# Patient Record
Sex: FEMALE | NOT HISPANIC OR LATINO | Employment: PART TIME | URBAN - METROPOLITAN AREA
[De-identification: names, ages, dates, MRNs, and addresses within clinical notes are randomized per-mention and may not be internally consistent; named-entity substitution may affect disease eponyms.]

---

## 2017-02-02 ENCOUNTER — OFFICE VISIT (OUTPATIENT)
Dept: INTERNAL MEDICINE | Facility: CLINIC | Age: 32
End: 2017-02-02

## 2017-02-02 VITALS
WEIGHT: 229 LBS | DIASTOLIC BLOOD PRESSURE: 72 MMHG | BODY MASS INDEX: 35.94 KG/M2 | SYSTOLIC BLOOD PRESSURE: 116 MMHG | HEIGHT: 67 IN

## 2017-02-02 DIAGNOSIS — K21.00 REFLUX ESOPHAGITIS: Primary | ICD-10-CM

## 2017-02-02 LAB
ALBUMIN SERPL-MCNC: 3.9 G/DL (ref 3.2–4.8)
ALBUMIN/GLOB SERPL: 1.4 G/DL (ref 1.5–2.5)
ALP SERPL-CCNC: 57 U/L (ref 25–100)
ALT SERPL-CCNC: 26 U/L (ref 7–40)
AST SERPL-CCNC: 20 U/L (ref 0–33)
BILIRUB SERPL-MCNC: 0.6 MG/DL (ref 0.3–1.2)
BUN SERPL-MCNC: 11 MG/DL (ref 9–23)
BUN/CREAT SERPL: 13.8 (ref 7–25)
CALCIUM SERPL-MCNC: 9.8 MG/DL (ref 8.7–10.4)
CHLORIDE SERPL-SCNC: 105 MMOL/L (ref 99–109)
CO2 SERPL-SCNC: 27 MMOL/L (ref 20–31)
CREAT SERPL-MCNC: 0.8 MG/DL (ref 0.6–1.3)
GLOBULIN SER CALC-MCNC: 2.7 GM/DL
GLUCOSE SERPL-MCNC: 86 MG/DL (ref 70–100)
LIPASE SERPL-CCNC: 36 U/L (ref 6–51)
POTASSIUM SERPL-SCNC: 4.2 MMOL/L (ref 3.5–5.5)
PROT SERPL-MCNC: 6.6 G/DL (ref 5.7–8.2)
SODIUM SERPL-SCNC: 138 MMOL/L (ref 132–146)

## 2017-02-02 PROCEDURE — 99213 OFFICE O/P EST LOW 20 MIN: CPT | Performed by: PHYSICIAN ASSISTANT

## 2017-02-02 RX ORDER — OMEPRAZOLE 40 MG/1
40 CAPSULE, DELAYED RELEASE ORAL DAILY
Qty: 30 CAPSULE | Refills: 2 | Status: SHIPPED | OUTPATIENT
Start: 2017-02-02 | End: 2017-07-16 | Stop reason: SDUPTHER

## 2017-02-02 NOTE — PROGRESS NOTES
Chief Complaint   Patient presents with   • Vomiting, heart burn ongoing x3 weeks     Sharp abdominal pain x1 year       Subjective   Serena Marquez is a 31 y.o. female.       History of Present Illness     Pt has had intermittent sharp abdominal pain for over a year- last for a few seconds and then resolves.    About 3 weeks ago, pt woke up with heartburn and started vomiting. Felt better for most of the day with some mild residual heartburn. Now has continued to happen once a week. Tried some carafate and it helps after a while. Tried tums, prilosec for about a week, zantac. Happens about 1/2 hr to 1 hr after she wakes up if it is going to happen. Usually before she eats.     Current Outpatient Prescriptions:   •  desonide (DESOWEN) 0.05 % cream, Apply  topically., Disp: , Rfl:   •  DiphenhydrAMINE HCl, Sleep, 25 MG capsule, Take  by mouth., Disp: , Rfl:   •  FLUoxetine (PROzac) 40 MG capsule, Take 1 capsule by mouth Daily., Disp: 30 capsule, Rfl: 0  •  levocetirizine (XYZAL) 5 MG tablet, Take  by mouth., Disp: , Rfl:   •  levothyroxine (SYNTHROID, LEVOTHROID) 200 MCG tablet, Take 200 mcg by mouth Daily., Disp: , Rfl:   •  lysine acetate 500 MG tablet tablet, Take 1,000 mg by mouth Daily., Disp: , Rfl:   •  montelukast (SINGULAIR) 10 MG tablet, Take 1 tablet by mouth every night., Disp: 30 tablet, Rfl: 0  •  Norgestimate-Eth Estradiol (SPRINTEC 28 PO), Take  by mouth., Disp: , Rfl:   •  predniSONE (DELTASONE) 10 MG tablet, Take 4 tablets for 3 days, then 3 tablets for 3 days, then 2 tablets for 3 days, then 1 tablet for 3 days, Disp: 30 tablet, Rfl: 0  •  spironolactone (ALDACTONE) 50 MG tablet, Take 1 tablet by mouth daily., Disp: 30 tablet, Rfl: 0  •  SPRINTEC 28 0.25-35 MG-MCG per tablet, TAKE ONE TABLET BY MOUTH ONCE DAILY AS DIRECTED, Disp: 168 tablet, Rfl: 0  •  sucralfate (CARAFATE) 1 GM/10ML suspension, Take 1 g by mouth 4 (Four) Times a Day., Disp: , Rfl:   •  omeprazole (PRILOSEC) 40 MG capsule, Take 1  "capsule by mouth Daily., Disp: 30 capsule, Rfl: 2     PMFSH  The following portions of the patient's history were reviewed and updated as appropriate: allergies, current medications, past family history, past medical history, past social history, past surgical history and problem list.    Review of Systems   Constitutional: Positive for appetite change. Negative for activity change, chills, diaphoresis, fatigue and fever.   HENT: Negative for congestion, postnasal drip, rhinorrhea, sore throat and trouble swallowing.    Respiratory: Negative for choking, chest tightness, shortness of breath and wheezing.    Cardiovascular: Negative for chest pain and palpitations.   Gastrointestinal: Positive for abdominal pain, nausea and vomiting. Negative for abdominal distention, blood in stool, constipation, diarrhea and rectal pain.   Genitourinary: Negative.    Musculoskeletal: Negative for arthralgias and myalgias.   Skin: Negative.    Neurological: Negative for dizziness, weakness and light-headedness.   Hematological: Negative for adenopathy.   Psychiatric/Behavioral: Negative.        Objective   Visit Vitals   • /72   • Ht 67\" (170.2 cm)   • Wt 229 lb (104 kg)   • BMI 35.87 kg/m2       Physical Exam   Constitutional: She appears well-developed and well-nourished.   HENT:   Head: Normocephalic.   Right Ear: Hearing, tympanic membrane, external ear and ear canal normal.   Left Ear: Hearing, tympanic membrane, external ear and ear canal normal.   Nose: Nose normal.   Mouth/Throat: Oropharynx is clear and moist.   Eyes: Conjunctivae are normal. Pupils are equal, round, and reactive to light.   Neck: Normal range of motion.   Cardiovascular: Normal rate, regular rhythm and normal heart sounds.    Pulmonary/Chest: Effort normal and breath sounds normal. She has no decreased breath sounds. She has no wheezes. She has no rhonchi. She has no rales.   Abdominal: Soft. Bowel sounds are normal. There is no hepatosplenomegaly. " There is tenderness in the epigastric area and left upper quadrant. There is no rigidity, no rebound, no guarding, no CVA tenderness and negative Ly's sign.   Musculoskeletal: Normal range of motion.   Neurological: She is alert.   Skin: Skin is warm and dry.   Psychiatric: She has a normal mood and affect. Her behavior is normal.   Nursing note and vitals reviewed.           ASSESSMENT/PLAN    Problem List Items Addressed This Visit        Digestive    Reflux esophagitis - Primary     Check stool for h. Pylori (pt recently took antacids), cmp, lipase. Start prescription strength omeprazole. Refer for EGD. Further recommendations based on results.           Relevant Medications    omeprazole (PRILOSEC) 40 MG capsule    Other Relevant Orders    H. Pylori Antigen, Stool    Ambulatory referral for Screening EGD    Comprehensive Metabolic Panel    Lipase               Return if symptoms worsen or fail to improve, for Next scheduled follow up.

## 2017-02-02 NOTE — ASSESSMENT & PLAN NOTE
Check stool for h. Pylori (pt recently took antacids), cmp, lipase. Start prescription strength omeprazole. Refer for EGD. Further recommendations based on results.

## 2017-02-05 LAB — H PYLORI AG STL QL IA: NEGATIVE

## 2017-04-10 ENCOUNTER — LAB REQUISITION (OUTPATIENT)
Dept: LAB | Facility: HOSPITAL | Age: 32
End: 2017-04-10

## 2017-04-10 ENCOUNTER — OUTSIDE FACILITY SERVICE (OUTPATIENT)
Dept: GASTROENTEROLOGY | Facility: CLINIC | Age: 32
End: 2017-04-10

## 2017-04-10 DIAGNOSIS — R11.12 PROJECTILE VOMITING: ICD-10-CM

## 2017-04-10 DIAGNOSIS — K21.9 GASTRO-ESOPHAGEAL REFLUX DISEASE WITHOUT ESOPHAGITIS: ICD-10-CM

## 2017-04-10 DIAGNOSIS — R10.13 EPIGASTRIC PAIN: ICD-10-CM

## 2017-04-10 PROCEDURE — 88305 TISSUE EXAM BY PATHOLOGIST: CPT | Performed by: INTERNAL MEDICINE

## 2017-04-10 PROCEDURE — 43239 EGD BIOPSY SINGLE/MULTIPLE: CPT | Performed by: INTERNAL MEDICINE

## 2017-04-14 LAB
CYTO UR: NORMAL
LAB AP CASE REPORT: NORMAL
LAB AP CLINICAL INFORMATION: NORMAL
Lab: NORMAL
PATH REPORT.FINAL DX SPEC: NORMAL
PATH REPORT.GROSS SPEC: NORMAL

## 2017-04-17 ENCOUNTER — TELEPHONE (OUTPATIENT)
Dept: GASTROENTEROLOGY | Facility: CLINIC | Age: 32
End: 2017-04-17

## 2017-04-17 NOTE — TELEPHONE ENCOUNTER
----- Message from Vince Floyd MD sent at 4/17/2017 11:47 AM EDT -----  Let Mrs. Marquez know there is evidence for reflux esophagitis.  She did have some gastritis without H. pylori.  I recommend avoiding all non-steroidal medication.  She should continue Prilosec once daily.  Thank you,  BENNETT

## 2017-07-16 DIAGNOSIS — K21.00 REFLUX ESOPHAGITIS: ICD-10-CM

## 2017-07-17 RX ORDER — OMEPRAZOLE 40 MG/1
CAPSULE, DELAYED RELEASE ORAL
Qty: 30 CAPSULE | Refills: 2 | Status: SHIPPED | OUTPATIENT
Start: 2017-07-17 | End: 2018-01-23

## 2017-07-28 ENCOUNTER — OFFICE VISIT (OUTPATIENT)
Dept: INTERNAL MEDICINE | Facility: CLINIC | Age: 32
End: 2017-07-28

## 2017-07-28 VITALS
OXYGEN SATURATION: 99 % | HEART RATE: 77 BPM | BODY MASS INDEX: 34.3 KG/M2 | WEIGHT: 219 LBS | SYSTOLIC BLOOD PRESSURE: 128 MMHG | TEMPERATURE: 98.1 F | DIASTOLIC BLOOD PRESSURE: 68 MMHG
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
## 2017-07-28 PROCEDURE — 99213 OFFICE O/P EST LOW 20 MIN: CPT | Performed by: PHYSICIAN ASSISTANT

## 2017-07-28 NOTE — PROGRESS NOTES
Chief Complaint   Patient presents with   • Rabies exposure treatment     bit by a dog on 07/09/17, sinus congestion        Subjective   Serena Marquez is a 32 y.o. female.       History of Present Illness     Pt was working at [employer] and on 7/9/17 she was putting one of the dogs back in the kennel and was bit on her right first finger. She washed the wound with dish soap immediately after and told the supervisor and then went to [facility] the next day. Had significant swelling at the time. Was treated with tetanus shot and abx. She was unable to talk to the dog owner or get access to dog's chart to check on rabies vaccination status. Recently notified by letter from health department that she needs to get rabies vaccine because they have been unable to locate dog owner. Pt's injury has resolved and she is otherwise asymptomatic.      Current Outpatient Prescriptions:   •  acetaZOLAMIDE (DIAMOX) 250 MG tablet, Take 250 mg by mouth 3 (Three) Times a Day., Disp: , Rfl:   •  desonide (DESOWEN) 0.05 % cream, Apply  topically., Disp: , Rfl:   •  DiphenhydrAMINE HCl, Sleep, 25 MG capsule, Take  by mouth., Disp: , Rfl:   •  Etonogestrel (NEXPLANON) 68 MG implant subdermal implant, Inject 1 each into the skin 1 (One) Time., Disp: , Rfl:   •  FLUoxetine (PROzac) 40 MG capsule, Take 1 capsule by mouth Daily., Disp: 30 capsule, Rfl: 0  •  levocetirizine (XYZAL) 5 MG tablet, Take  by mouth., Disp: , Rfl:   •  levothyroxine (SYNTHROID, LEVOTHROID) 200 MCG tablet, Take 200 mcg by mouth Daily., Disp: , Rfl:   •  lysine acetate 500 MG tablet tablet, Take 1,000 mg by mouth Daily., Disp: , Rfl:   •  montelukast (SINGULAIR) 10 MG tablet, Take 1 tablet by mouth every night., Disp: 30 tablet, Rfl: 0  •  Norgestimate-Eth Estradiol (SPRINTEC 28 PO), Take  by mouth., Disp: , Rfl:   •  omeprazole (priLOSEC) 40 MG capsule, TAKE ONE CAPSULE BY MOUTH ONCE DAILY, Disp: 30 capsule, Rfl: 2  •  predniSONE (DELTASONE) 10 MG tablet, Take 4 tablets  for 3 days, then 3 tablets for 3 days, then 2 tablets for 3 days, then 1 tablet for 3 days, Disp: 30 tablet, Rfl: 0  •  spironolactone (ALDACTONE) 50 MG tablet, Take 1 tablet by mouth daily., Disp: 30 tablet, Rfl: 0  •  SPRINTEC 28 0.25-35 MG-MCG per tablet, TAKE ONE TABLET BY MOUTH ONCE DAILY AS DIRECTED, Disp: 168 tablet, Rfl: 0  •  sucralfate (CARAFATE) 1 GM/10ML suspension, Take 1 g by mouth 4 (Four) Times a Day., Disp: , Rfl:      PMFSH  The following portions of the patient's history were reviewed and updated as appropriate: allergies, current medications, past family history, past medical history, past social history, past surgical history and problem list.    Review of Systems   Constitutional: Negative for appetite change, fever and unexpected weight change.   HENT: Negative for ear pain, facial swelling and sore throat.    Eyes: Negative for pain and visual disturbance.   Respiratory: Negative for chest tightness, shortness of breath and wheezing.    Cardiovascular: Negative for chest pain and palpitations.   Gastrointestinal: Negative for abdominal pain and blood in stool.   Endocrine: Negative.    Genitourinary: Negative for difficulty urinating and hematuria.   Musculoskeletal: Negative for joint swelling.   Neurological: Negative for tremors, seizures, syncope and headaches.   Hematological: Negative for adenopathy.   Psychiatric/Behavioral: Negative.        Objective   /68  Pulse 77  Temp 98.1 °F (36.7 °C)  Wt 219 lb (99.3 kg)  SpO2 99%  BMI 34.3 kg/m2    Physical Exam   Constitutional: She appears well-developed and well-nourished.   HENT:   Head: Normocephalic.   Right Ear: Hearing, tympanic membrane, external ear and ear canal normal.   Left Ear: Hearing, tympanic membrane, external ear and ear canal normal.   Nose: Nose normal.   Mouth/Throat: Oropharynx is clear and moist.   Eyes: Conjunctivae are normal. Pupils are equal, round, and reactive to light.   Neck: Normal range of motion.  "  Cardiovascular: Normal rate, regular rhythm and normal heart sounds.    Pulmonary/Chest: Effort normal and breath sounds normal. She has no decreased breath sounds. She has no wheezes. She has no rhonchi. She has no rales.   Musculoskeletal: Normal range of motion.   Neurological: She is alert.   Skin: Skin is warm and dry.   Psychiatric: She has a normal mood and affect. Her behavior is normal.   Nursing note and vitals reviewed.           ASSESSMENT/PLAN    Problem List Items Addressed This Visit     None      Visit Diagnoses     Dog bite, initial encounter    -  Primary    Unkown status of dog's rabies vaccinations. Called [facility] and spoke with manager who says they will fax over the imm. records. Further recs based on results.      8/4/2017- ADDENDUM: Received vaccination records from [facility]- showed UTD Rabies vaccine for \"Helen\", the dog who bit pt. Vaccine expires 9/26/2017- records scanned into chart.         Return for Recheck.  "

## 2017-08-07 ENCOUNTER — OFFICE VISIT (OUTPATIENT)
Dept: INTERNAL MEDICINE | Facility: CLINIC | Age: 32
End: 2017-08-07

## 2017-08-07 VITALS
OXYGEN SATURATION: 94 % | DIASTOLIC BLOOD PRESSURE: 60 MMHG | BODY MASS INDEX: 34.93 KG/M2 | WEIGHT: 223 LBS | HEART RATE: 78 BPM | SYSTOLIC BLOOD PRESSURE: 122 MMHG

## 2017-08-07 DIAGNOSIS — Z00.00 HEALTH CARE MAINTENANCE: Primary | ICD-10-CM

## 2017-08-07 LAB
BILIRUB BLD-MCNC: NEGATIVE MG/DL
CLARITY, POC: CLEAR
COLOR UR: YELLOW
GLUCOSE UR STRIP-MCNC: NEGATIVE MG/DL
KETONES UR QL: NEGATIVE
LEUKOCYTE EST, POC: NEGATIVE
NITRITE UR-MCNC: NEGATIVE MG/ML
PH UR: 7 [PH] (ref 5–8)
PROT UR STRIP-MCNC: NEGATIVE MG/DL
RBC # UR STRIP: NEGATIVE /UL
SP GR UR: 1.01 (ref 1–1.03)
UROBILINOGEN UR QL: NORMAL

## 2017-08-07 PROCEDURE — 81003 URINALYSIS AUTO W/O SCOPE: CPT | Performed by: PHYSICIAN ASSISTANT

## 2017-08-07 PROCEDURE — 86580 TB INTRADERMAL TEST: CPT | Performed by: PHYSICIAN ASSISTANT

## 2017-08-07 PROCEDURE — 99395 PREV VISIT EST AGE 18-39: CPT | Performed by: PHYSICIAN ASSISTANT

## 2017-08-07 NOTE — PROGRESS NOTES
"Chief Complaint   Patient presents with   • Annual Exam       Subjective   Serena Marquez is a 32 y.o. female.       History of Present Illness     The patient is being seen for a health maintenance evaluation.  The last health maintenance was 3 year(s) ago.    Social History  Serena  does not smoke cigarettes.   She drinks rare alcohol.  She does not use illicit drugs.    General History  Serena  does have regular dental visits.  She does complain of vision problems. Wears reading glasses. Last eye exam was 3/2017.  Immunizations are up to date. The patient needs the following immunizations: TDaP done 7/10/17; 12/2016; needs titer for hepatitis B; utd on MMR    Lifestyle  Serena  consumes in general, a \"healthy\" diet  .  She exercises never.    Reproductive Health  Serena  is premenopausal.  She reports irregular periods.  She is not sexually active. Her contraceptive plan is oral contraceptives (estrogen/progesterone).    Screening  Last pap was 7/2017 by Dr Bales at .  Last mammogram was  Never. Paternal GM with breast cancer in her late 50s.  Last colonoscopy was never. Paternal GF with colon CA in age 70s. Father has had normal colonoscopies.  Last DEXA was never.                      Current Outpatient Prescriptions:   •  acetaZOLAMIDE (DIAMOX) 250 MG tablet, Take 250 mg by mouth 3 (Three) Times a Day., Disp: , Rfl:   •  desonide (DESOWEN) 0.05 % cream, Apply  topically., Disp: , Rfl:   •  DiphenhydrAMINE HCl, Sleep, 25 MG capsule, Take  by mouth., Disp: , Rfl:   •  Etonogestrel (NEXPLANON) 68 MG implant subdermal implant, Inject 1 each into the skin 1 (One) Time., Disp: , Rfl:   •  FLUoxetine (PROzac) 40 MG capsule, Take 1 capsule by mouth Daily., Disp: 30 capsule, Rfl: 0  •  levocetirizine (XYZAL) 5 MG tablet, Take  by mouth., Disp: , Rfl:   •  levothyroxine (SYNTHROID, LEVOTHROID) 200 MCG tablet, Take 200 mcg by mouth Daily., Disp: , Rfl:   •  lysine acetate 500 MG tablet tablet, Take 1,000 mg by " mouth Daily., Disp: , Rfl:   •  montelukast (SINGULAIR) 10 MG tablet, Take 1 tablet by mouth every night., Disp: 30 tablet, Rfl: 0  •  Norgestimate-Eth Estradiol (SPRINTEC 28 PO), Take  by mouth., Disp: , Rfl:   •  omeprazole (priLOSEC) 40 MG capsule, TAKE ONE CAPSULE BY MOUTH ONCE DAILY, Disp: 30 capsule, Rfl: 2  •  spironolactone (ALDACTONE) 50 MG tablet, Take 1 tablet by mouth daily., Disp: 30 tablet, Rfl: 0  •  SPRINTEC 28 0.25-35 MG-MCG per tablet, TAKE ONE TABLET BY MOUTH ONCE DAILY AS DIRECTED, Disp: 168 tablet, Rfl: 0  •  sucralfate (CARAFATE) 1 GM/10ML suspension, Take 1 g by mouth 4 (Four) Times a Day., Disp: , Rfl:      PMFSH  The following portions of the patient's history were reviewed and updated as appropriate: allergies, current medications, past family history, past medical history, past social history, past surgical history and problem list.    Review of Systems   Constitutional: Negative for appetite change, fever and unexpected weight change.   HENT: Negative for ear pain, facial swelling and sore throat.    Eyes: Negative for pain and visual disturbance.   Respiratory: Negative for chest tightness, shortness of breath and wheezing.    Cardiovascular: Negative for chest pain and palpitations.   Gastrointestinal: Negative for abdominal pain and blood in stool.   Endocrine: Negative.    Genitourinary: Negative for difficulty urinating and hematuria.   Musculoskeletal: Negative for joint swelling.   Neurological: Negative for tremors, seizures and syncope.   Hematological: Negative for adenopathy.   Psychiatric/Behavioral: Negative.        Objective   /60  Pulse 78  Wt 223 lb (101 kg)  SpO2 94%  BMI 34.93 kg/m2    Physical Exam   Constitutional: She is oriented to person, place, and time. She appears well-developed and well-nourished. No distress.   HENT:   Head: Normocephalic and atraumatic. Hair is normal.   Right Ear: Hearing, tympanic membrane, external ear and ear canal normal. No  drainage. No decreased hearing is noted.   Left Ear: Hearing, tympanic membrane, external ear and ear canal normal. No decreased hearing is noted.   Nose: No nasal deformity.   Mouth/Throat: Oropharynx is clear and moist.   Eyes: Conjunctivae, EOM and lids are normal. Pupils are equal, round, and reactive to light. Lids are everted and swept, no foreign bodies found. Right eye exhibits no discharge. Left eye exhibits no discharge.   Fundoscopic exam:       The right eye shows red reflex.        The left eye shows red reflex.   Neck: Normal range of motion. Neck supple. No JVD present. No tracheal deviation present. No thyromegaly present.   Cardiovascular: Normal rate, regular rhythm, normal heart sounds, intact distal pulses and normal pulses.  Exam reveals no gallop and no friction rub.    No murmur heard.  Pulmonary/Chest: Effort normal and breath sounds normal. No respiratory distress. She has no wheezes. She has no rales. She exhibits no tenderness.   Abdominal: Soft. Bowel sounds are normal. She exhibits no distension and no mass. There is no tenderness. There is no rebound and no guarding. No hernia.   Musculoskeletal: Normal range of motion. She exhibits no edema, tenderness or deformity.   Lymphadenopathy:     She has no cervical adenopathy.        Right: No inguinal adenopathy present.        Left: No inguinal adenopathy present.   Neurological: She is alert and oriented to person, place, and time. She has normal reflexes. She displays normal reflexes. No cranial nerve deficit. She exhibits normal muscle tone. Coordination normal.   Skin: Skin is warm and dry. No rash noted. She is not diaphoretic. No erythema.   Psychiatric: She has a normal mood and affect. Her behavior is normal. Judgment and thought content normal.   Nursing note and vitals reviewed.      Results for orders placed or performed in visit on 08/07/17   POCT urinalysis dipstick, automated   Result Value Ref Range    Color Yellow Yellow,  Straw, Dark Yellow, Arlet    Clarity, UA Clear Clear    Glucose, UA Negative Negative, 1000 mg/dL (3+) mg/dL    Bilirubin Negative Negative    Ketones, UA Negative Negative    Specific Gravity  1.010 1.005 - 1.030    Blood, UA Negative Negative    pH, Urine 7.0 5.0 - 8.0    Protein, POC Negative Negative mg/dL    Urobilinogen, UA Normal Normal    Leukocytes Negative Negative    Nitrite, UA Negative Negative        ASSESSMENT/PLAN    Problem List Items Addressed This Visit        Other    Health care maintenance - Primary     Immunizations: check hepatitis B titer; TDaP done 7/2017; influenza vaccine done 12/2016; TB skin test today  Eye exam: done 3/2017  Pap Smear: done 7/2017, Dr Bales at   Mammogram: due age 42  Dexa: due age 52  Colonoscopy: due age 52  Labs: fasting labs ordered today         Relevant Orders    Hepatitis B Surface Antibody    Lipid Panel    Comprehensive Metabolic Panel    CBC (No Diff)    TSH    Vitamin D 25 Hydroxy    TB Skin Test (Completed)    T3, Free    T4, Free    POCT urinalysis dipstick, automated (Completed)               Return in about 1 year (around 8/7/2018) for Annual physical.

## 2017-08-08 LAB
25(OH)D3+25(OH)D2 SERPL-MCNC: 27.4 NG/ML
ALBUMIN SERPL-MCNC: 4.1 G/DL (ref 3.2–4.8)
ALBUMIN/GLOB SERPL: 1.8 G/DL (ref 1.5–2.5)
ALP SERPL-CCNC: 77 U/L (ref 25–100)
ALT SERPL-CCNC: 12 U/L (ref 7–40)
AST SERPL-CCNC: 14 U/L (ref 0–33)
BILIRUB SERPL-MCNC: 0.4 MG/DL (ref 0.3–1.2)
BUN SERPL-MCNC: 12 MG/DL (ref 9–23)
BUN/CREAT SERPL: 17.1 (ref 7–25)
CALCIUM SERPL-MCNC: 9.3 MG/DL (ref 8.7–10.4)
CHLORIDE SERPL-SCNC: 111 MMOL/L (ref 99–109)
CHOLEST SERPL-MCNC: 180 MG/DL (ref 0–200)
CO2 SERPL-SCNC: 24 MMOL/L (ref 20–31)
CREAT SERPL-MCNC: 0.7 MG/DL (ref 0.6–1.3)
ERYTHROCYTE [DISTWIDTH] IN BLOOD BY AUTOMATED COUNT: 12.8 % (ref 11.3–14.5)
GLOBULIN SER CALC-MCNC: 2.3 GM/DL
GLUCOSE SERPL-MCNC: 87 MG/DL (ref 70–100)
HBV SURFACE AB SER QL: REACTIVE
HCT VFR BLD AUTO: 41.2 % (ref 34.5–44)
HDLC SERPL-MCNC: 58 MG/DL (ref 40–60)
HGB BLD-MCNC: 13.7 G/DL (ref 11.5–15.5)
LDLC SERPL CALC-MCNC: 108 MG/DL (ref 0–100)
MCH RBC QN AUTO: 30.2 PG (ref 27–31)
MCHC RBC AUTO-ENTMCNC: 33.3 G/DL (ref 32–36)
MCV RBC AUTO: 90.9 FL (ref 80–99)
PLATELET # BLD AUTO: 272 10*3/MM3 (ref 150–450)
POTASSIUM SERPL-SCNC: 4.3 MMOL/L (ref 3.5–5.5)
PROT SERPL-MCNC: 6.4 G/DL (ref 5.7–8.2)
RBC # BLD AUTO: 4.53 10*6/MM3 (ref 3.89–5.14)
SODIUM SERPL-SCNC: 140 MMOL/L (ref 132–146)
T3FREE SERPL-MCNC: 3.8 PG/ML (ref 2–4.4)
T4 FREE SERPL-MCNC: 1.45 NG/DL (ref 0.89–1.76)
TRIGL SERPL-MCNC: 68 MG/DL (ref 0–150)
TSH SERPL DL<=0.005 MIU/L-ACNC: 0.01 MIU/ML (ref 0.35–5.35)
VLDLC SERPL CALC-MCNC: 13.6 MG/DL
WBC # BLD AUTO: 6 10*3/MM3 (ref 3.5–10.8)

## 2017-08-08 NOTE — ASSESSMENT & PLAN NOTE
Immunizations: check hepatitis B titer; TDaP done 7/2017; influenza vaccine done 12/2016; TB skin test today  Eye exam: done 3/2017  Pap Smear: done 7/2017, Dr Bales at   Mammogram: due age 42  Dexa: due age 52  Colonoscopy: due age 52  Labs: fasting labs ordered today

## 2017-08-09 LAB
INDURATION: 0 MM (ref 0–10)
TB SKIN TEST: NEGATIVE

## 2017-08-10 ENCOUNTER — TELEPHONE (OUTPATIENT)
Dept: INTERNAL MEDICINE | Facility: CLINIC | Age: 32
End: 2017-08-10

## 2017-08-10 NOTE — TELEPHONE ENCOUNTER
PATIENT IS CALLING REQUESTING A COPY OF HER PHYSICAL THAT SHE HAD DONE AUGUST 7TH, 2017. PLEASE CALL BACK AND ADVISE.

## 2017-08-10 NOTE — PROGRESS NOTES
Your TSH is low, indicating that your levothyroxine dose is too high. Please call our office to confirm your dose and I will send in the next dose down from that.    Your hepatitis B lab confirms your immunity- no need for an additional vaccine.    Everything else looks fine!

## 2017-08-11 NOTE — TELEPHONE ENCOUNTER
Called and informed pt that her physical form has been completed by Chiquis and is ready for , placed form at front.

## 2018-01-03 RX ORDER — FLUOXETINE HYDROCHLORIDE 40 MG/1
40 CAPSULE ORAL DAILY
Qty: 30 CAPSULE | Refills: 1 | Status: SHIPPED | OUTPATIENT
Start: 2018-01-03 | End: 2018-01-23

## 2018-01-03 NOTE — TELEPHONE ENCOUNTER
Received fax from [facility] pharmacy requesting refill for fluoxetine 40 mg cap, med refilled electronically pt is within protocol.

## 2018-01-23 ENCOUNTER — TELEPHONE (OUTPATIENT)
Dept: INTERNAL MEDICINE | Facility: CLINIC | Age: 33
End: 2018-01-23

## 2018-01-23 ENCOUNTER — OFFICE VISIT (OUTPATIENT)
Dept: INTERNAL MEDICINE | Facility: CLINIC | Age: 33
End: 2018-01-23

## 2018-01-23 VITALS
BODY MASS INDEX: 34.77 KG/M2 | DIASTOLIC BLOOD PRESSURE: 70 MMHG | HEART RATE: 78 BPM | WEIGHT: 222 LBS | OXYGEN SATURATION: 100 % | SYSTOLIC BLOOD PRESSURE: 118 MMHG

## 2018-01-23 DIAGNOSIS — F41.8 DEPRESSION WITH ANXIETY: Primary | Chronic | ICD-10-CM

## 2018-01-23 DIAGNOSIS — J30.2 CHRONIC SEASONAL ALLERGIC RHINITIS, UNSPECIFIED TRIGGER: Chronic | ICD-10-CM

## 2018-01-23 PROCEDURE — 99213 OFFICE O/P EST LOW 20 MIN: CPT | Performed by: PHYSICIAN ASSISTANT

## 2018-01-23 RX ORDER — FLUOXETINE HYDROCHLORIDE 60 MG/1
60 TABLET, FILM COATED ORAL; ORAL DAILY
Qty: 30 TABLET | Refills: 5 | Status: SHIPPED | OUTPATIENT
Start: 2018-01-23 | End: 2018-01-23

## 2018-01-23 RX ORDER — BUSPIRONE HYDROCHLORIDE 5 MG/1
5 TABLET ORAL 2 TIMES DAILY
Qty: 60 TABLET | Refills: 2 | Status: SHIPPED | OUTPATIENT
Start: 2018-01-23 | End: 2018-02-20 | Stop reason: SDUPTHER

## 2018-01-23 RX ORDER — MONTELUKAST SODIUM 10 MG/1
10 TABLET ORAL NIGHTLY
Qty: 30 TABLET | Refills: 5 | Status: SHIPPED | OUTPATIENT
Start: 2018-01-23 | End: 2018-07-28 | Stop reason: SDUPTHER

## 2018-01-23 RX ORDER — FLUOXETINE HYDROCHLORIDE 20 MG/1
60 CAPSULE ORAL DAILY
Qty: 90 CAPSULE | Refills: 2 | Status: SHIPPED | OUTPATIENT
Start: 2018-01-23 | End: 2018-05-04 | Stop reason: SDUPTHER

## 2018-01-23 NOTE — TELEPHONE ENCOUNTER
Sent in prozac 20 mg capsules #3 daily to see if that is covered instead. Please check with the pharmacist to see if that will work. Thanks

## 2018-01-23 NOTE — ASSESSMENT & PLAN NOTE
Psychological condition is worsening.  Medication changes per orders.  Referral to psychological counseling. Increase prozac to 60 mg daily and add buspar 5 mg BID.  Psychological condition  will be reassessed in 4 weeks.

## 2018-01-23 NOTE — TELEPHONE ENCOUNTER
PT CALLED AND SAID HER PHARMACY TOLD HER THE HIGHER DOSE OF PROZAC IS NOT COVERED WITH HER INSURANCE.

## 2018-01-23 NOTE — PROGRESS NOTES
Chief Complaint   Patient presents with   • Follow-up     anxiety/depression, discuss meds       Subjective   Serena Marquez is a 32 y.o. female.       History of Present Illness     Pt needs refill of singulair, ran out of it and can tell that it makes a difference in her allergies and asthma.    Pt has been on SSRI for several years. She feels like the prozac is not working as well as it used to. She is feeling more depressed and anxious. 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 She has days when she cannot leave the house. Open to seeing a therapist, not sure where to start.      Current Outpatient Prescriptions:   •  acetaZOLAMIDE (DIAMOX) 250 MG tablet, Take 250 mg by mouth 3 (Three) Times a Day., Disp: , Rfl:   •  Etonogestrel (NEXPLANON) 68 MG implant subdermal implant, Inject 1 each into the skin 1 (One) Time., Disp: , Rfl:   •  levocetirizine (XYZAL) 5 MG tablet, Take  by mouth., Disp: , Rfl:   •  levothyroxine (SYNTHROID, LEVOTHROID) 200 MCG tablet, Take 200 mcg by mouth Daily., Disp: , Rfl:   •  lysine acetate 500 MG tablet tablet, Take 1,000 mg by mouth Daily., Disp: , Rfl:   •  montelukast (SINGULAIR) 10 MG tablet, Take 1 tablet by mouth Every Night., Disp: 30 tablet, Rfl: 5  •  busPIRone (BUSPAR) 5 MG tablet, Take 1 tablet by mouth 2 (Two) Times a Day., Disp: 60 tablet, Rfl: 2  •  DiphenhydrAMINE HCl, Sleep, 25 MG capsule, Take  by mouth., Disp: , Rfl:   •  FLUoxetine (PROzac) 60 MG tablet, Take 1 tablet by mouth Daily., Disp: 30 tablet, Rfl: 5     PMFSH  The following portions of the patient's history were reviewed and updated as appropriate: allergies, current medications, past family history, past medical history, past social history, past surgical history and problem list.    Review of Systems   Constitutional: Negative for chills, fever and unexpected weight change.   HENT: Negative.    Eyes: Negative for  pain and visual disturbance.   Respiratory: Negative for chest tightness and shortness of breath.    Cardiovascular: Negative for chest pain.   Gastrointestinal: Negative for abdominal pain and blood in stool.   Endocrine: Negative.    Genitourinary: Negative.    Musculoskeletal: Negative for joint swelling.   Skin: Negative for color change, rash and wound.   Allergic/Immunologic: Negative.    Neurological: Negative for syncope and speech difficulty.   Hematological: Negative for adenopathy.   Psychiatric/Behavioral: Positive for decreased concentration. Negative for confusion, hallucinations and suicidal ideas. The patient is nervous/anxious.        Objective   /70  Pulse 78  Wt 101 kg (222 lb)  SpO2 100%  BMI 34.77 kg/m2    Physical Exam   Constitutional: She is oriented to person, place, and time. She appears well-developed and well-nourished. No distress.   HENT:   Head: Normocephalic.   Eyes: Conjunctivae and EOM are normal. Pupils are equal, round, and reactive to light.   Neck: Normal range of motion. Neck supple.   Cardiovascular: Normal rate, regular rhythm and normal heart sounds.    No murmur heard.  Pulmonary/Chest: Effort normal and breath sounds normal.   Musculoskeletal: Normal range of motion.   Neurological: She is alert and oriented to person, place, and time.   Skin: Skin is warm and dry. No rash noted. She is not diaphoretic.   Psychiatric: Her behavior is normal. Judgment and thought content normal. Her affect is not inappropriate. She is not actively hallucinating. Cognition and memory are normal.   Nursing note and vitals reviewed.           ASSESSMENT/PLAN    Problem List Items Addressed This Visit        Respiratory    Seasonal allergies (Chronic)     Restart singulair daily.         Relevant Medications    montelukast (SINGULAIR) 10 MG tablet       Other    Depression with anxiety - Primary (Chronic)     Psychological condition is worsening.  Medication changes per  orders.  Referral to psychological counseling. Increase prozac to 60 mg daily and add buspar 5 mg BID.  Psychological condition  will be reassessed in 4 weeks.         Relevant Medications    FLUoxetine (PROzac) 60 MG tablet    busPIRone (BUSPAR) 5 MG tablet               Return in about 4 weeks (around 2/20/2018) for Recheck.

## 2018-02-20 ENCOUNTER — OFFICE VISIT (OUTPATIENT)
Dept: INTERNAL MEDICINE | Facility: CLINIC | Age: 33
End: 2018-02-20

## 2018-02-20 VITALS
SYSTOLIC BLOOD PRESSURE: 116 MMHG | HEART RATE: 70 BPM | OXYGEN SATURATION: 99 % | DIASTOLIC BLOOD PRESSURE: 60 MMHG | WEIGHT: 222 LBS | BODY MASS INDEX: 34.77 KG/M2

## 2018-02-20 DIAGNOSIS — J30.2 CHRONIC SEASONAL ALLERGIC RHINITIS, UNSPECIFIED TRIGGER: Primary | Chronic | ICD-10-CM

## 2018-02-20 DIAGNOSIS — F41.8 DEPRESSION WITH ANXIETY: Chronic | ICD-10-CM

## 2018-02-20 PROCEDURE — 99213 OFFICE O/P EST LOW 20 MIN: CPT | Performed by: PHYSICIAN ASSISTANT

## 2018-02-20 RX ORDER — VALACYCLOVIR HYDROCHLORIDE 500 MG/1
1 TABLET, FILM COATED ORAL AS NEEDED
COMMUNITY
Start: 2018-02-19 | End: 2018-07-04 | Stop reason: SDUPTHER

## 2018-02-20 RX ORDER — ACETAZOLAMIDE 500 MG/1
1 CAPSULE, EXTENDED RELEASE ORAL DAILY
COMMUNITY
Start: 2018-02-20

## 2018-02-20 RX ORDER — LEVOCETIRIZINE DIHYDROCHLORIDE 5 MG/1
5 TABLET, FILM COATED ORAL EVERY EVENING
Qty: 30 TABLET | Refills: 5 | Status: SHIPPED | OUTPATIENT
Start: 2018-02-20

## 2018-02-20 RX ORDER — BUSPIRONE HYDROCHLORIDE 10 MG/1
5 TABLET ORAL 2 TIMES DAILY
Qty: 60 TABLET | Refills: 5 | Status: SHIPPED | OUTPATIENT
Start: 2018-02-20 | End: 2018-03-19 | Stop reason: SDUPTHER

## 2018-02-20 NOTE — PROGRESS NOTES
Chief Complaint   Patient presents with   • Follow-up     depression with anxiety       Subjective   Serena Marquez is a 32 y.o. female.       History of Present Illness     Pt feels like she is feeling better, less anxiety. She increased the prozac and started the buspar without any problem. She also took a leave from school because it was adding to her stress.      Current Outpatient Prescriptions:   •  acetaZOLAMIDE (DIAMOX) 500 MG capsule, Take 1 capsule by mouth Daily., Disp: , Rfl:   •  busPIRone (BUSPAR) 10 MG tablet, Take 0.5 tablets by mouth 2 (Two) Times a Day., Disp: 60 tablet, Rfl: 5  •  DiphenhydrAMINE HCl, Sleep, 25 MG capsule, Take  by mouth., Disp: , Rfl:   •  Etonogestrel (NEXPLANON) 68 MG implant subdermal implant, Inject 1 each into the skin 1 (One) Time., Disp: , Rfl:   •  FLUoxetine (PROzac) 20 MG capsule, Take 3 capsules by mouth Daily., Disp: 90 capsule, Rfl: 2  •  levocetirizine (XYZAL) 5 MG tablet, Take 1 tablet by mouth Every Evening., Disp: 30 tablet, Rfl: 5  •  levothyroxine (SYNTHROID, LEVOTHROID) 200 MCG tablet, Take 200 mcg by mouth Daily., Disp: , Rfl:   •  lysine acetate 500 MG tablet tablet, Take 1,000 mg by mouth Daily., Disp: , Rfl:   •  montelukast (SINGULAIR) 10 MG tablet, Take 1 tablet by mouth Every Night., Disp: 30 tablet, Rfl: 5  •  valACYclovir (VALTREX) 500 MG tablet, Take 1 tablet by mouth As Needed., Disp: , Rfl:      PMFSH  The following portions of the patient's history were reviewed and updated as appropriate: allergies, current medications, past family history, past medical history, past social history, past surgical history and problem list.    Review of Systems   Constitutional: Negative for chills, fever and unexpected weight change.   HENT: Negative.    Eyes: Negative for pain and visual disturbance.   Respiratory: Negative for chest tightness and shortness of breath.    Cardiovascular: Negative for chest pain.   Gastrointestinal: Negative for abdominal pain  and blood in stool.   Endocrine: Negative.    Genitourinary: Negative.    Musculoskeletal: Negative for joint swelling.   Skin: Negative for color change, rash and wound.   Allergic/Immunologic: Negative.    Neurological: Negative for syncope and speech difficulty.   Hematological: Negative for adenopathy.   Psychiatric/Behavioral: Negative for confusion, decreased concentration, dysphoric mood, hallucinations and suicidal ideas. The patient is not nervous/anxious.        Objective   /60  Pulse 70  Wt 101 kg (222 lb)  SpO2 99%  BMI 34.77 kg/m2    Physical Exam   Constitutional: She appears well-developed and well-nourished.   HENT:   Head: Normocephalic.   Right Ear: Hearing, tympanic membrane, external ear and ear canal normal.   Left Ear: Hearing, tympanic membrane, external ear and ear canal normal.   Nose: Nose normal.   Mouth/Throat: Oropharynx is clear and moist.   Eyes: Conjunctivae are normal. Pupils are equal, round, and reactive to light.   Neck: Normal range of motion.   Cardiovascular: Normal rate, regular rhythm and normal heart sounds.    Pulmonary/Chest: Effort normal and breath sounds normal. She has no decreased breath sounds. She has no wheezes. She has no rhonchi. She has no rales.   Musculoskeletal: Normal range of motion.   Neurological: She is alert.   Skin: Skin is warm and dry.   Psychiatric: She has a normal mood and affect. Her behavior is normal.   Nursing note and vitals reviewed.           ASSESSMENT/PLAN    Problem List Items Addressed This Visit        Respiratory    Seasonal allergies - Primary (Chronic)    Relevant Medications    levocetirizine (XYZAL) 5 MG tablet       Other    Depression with anxiety (Chronic)     Psychological condition is improving with treatment.  Regular aerobic exercise.  Medication changes per orders. Increase buspar to 10 mg BID, pt will taper up to 7.5 mg and then 10 mg if she has SE.  Psychological condition  will be reassessed at the next  regular appointment.         Relevant Medications    busPIRone (BUSPAR) 10 MG tablet               Return in about 6 months (around 8/20/2018) for Annual physical.

## 2018-02-20 NOTE — ASSESSMENT & PLAN NOTE
Psychological condition is improving with treatment.  Regular aerobic exercise.  Medication changes per orders. Increase buspar to 10 mg BID, pt will taper up to 7.5 mg and then 10 mg if she has SE.  Psychological condition  will be reassessed at the next regular appointment.

## 2018-03-19 ENCOUNTER — TELEPHONE (OUTPATIENT)
Dept: INTERNAL MEDICINE | Facility: CLINIC | Age: 33
End: 2018-03-19

## 2018-03-19 DIAGNOSIS — F41.8 DEPRESSION WITH ANXIETY: Chronic | ICD-10-CM

## 2018-03-19 RX ORDER — BUSPIRONE HYDROCHLORIDE 10 MG/1
10 TABLET ORAL 2 TIMES DAILY
Qty: 60 TABLET | Refills: 5 | Status: SHIPPED | OUTPATIENT
Start: 2018-03-19

## 2018-03-19 NOTE — TELEPHONE ENCOUNTER
PATIENT HAS CONCERNS ABOUT THE DOSAGE AND QUANTITY OF HER BUSTIRONE 10 MG. SHE STATES AS DIRECTED BY [facility] TAKE A TOTAL OF 20 MG RAD. SHE IS RUNNING OUT OF THIS MEDICATION AFTER 2 WEEKS DUE TO THE NEW DOSE AND QUANTITY OF THIS MEDICATION NEEDS TO BE CHANGED SO SHE WONT BE RUNNING OUT MEDICATION EARLY.

## 2018-05-04 DIAGNOSIS — F41.8 DEPRESSION WITH ANXIETY: Chronic | ICD-10-CM

## 2018-05-04 RX ORDER — FLUOXETINE HYDROCHLORIDE 20 MG/1
60 CAPSULE ORAL DAILY
Qty: 90 CAPSULE | Refills: 0 | Status: SHIPPED | OUTPATIENT
Start: 2018-05-04 | End: 2018-06-12 | Stop reason: SDUPTHER

## 2018-05-04 RX ORDER — PROMETHAZINE HYDROCHLORIDE 12.5 MG/1
12.5 TABLET ORAL EVERY 6 HOURS PRN
Qty: 20 TABLET | Refills: 0 | Status: SHIPPED | OUTPATIENT
Start: 2018-05-04

## 2018-05-04 NOTE — TELEPHONE ENCOUNTER
LOV: 02/20/18  pls advise on promethazine script request, not on med list.    - c/w HSS   - hold metformin at present

## 2018-05-04 NOTE — TELEPHONE ENCOUNTER
PATIENT CALLED FOR REFILL, SHE IS ALSO REQUESTING AN ORDER FOR PROMETHAZINE THAT WAS PRESCRIBED BY ANOTHER PROVIDER.

## 2018-06-12 DIAGNOSIS — F41.8 DEPRESSION WITH ANXIETY: Chronic | ICD-10-CM

## 2018-06-12 RX ORDER — FLUOXETINE HYDROCHLORIDE 20 MG/1
60 CAPSULE ORAL DAILY
Qty: 90 CAPSULE | Refills: 0 | Status: SHIPPED | OUTPATIENT
Start: 2018-06-12 | End: 2018-07-20 | Stop reason: SDUPTHER

## 2018-07-04 RX ORDER — VALACYCLOVIR HYDROCHLORIDE 500 MG/1
1000 TABLET, FILM COATED ORAL 2 TIMES DAILY
Qty: 4 TABLET | Refills: 2 | Status: SHIPPED | OUTPATIENT
Start: 2018-07-04

## 2018-07-20 DIAGNOSIS — F41.8 DEPRESSION WITH ANXIETY: Chronic | ICD-10-CM

## 2018-07-20 RX ORDER — ALBUTEROL SULFATE 90 UG/1
2 AEROSOL, METERED RESPIRATORY (INHALATION) EVERY 6 HOURS PRN
Qty: 1 INHALER | Refills: 0 | Status: SHIPPED | OUTPATIENT
Start: 2018-07-20 | End: 2019-05-21 | Stop reason: SDUPTHER

## 2018-07-20 RX ORDER — FLUOXETINE HYDROCHLORIDE 20 MG/1
60 CAPSULE ORAL DAILY
Qty: 90 CAPSULE | Refills: 0 | Status: SHIPPED | OUTPATIENT
Start: 2018-07-20 | End: 2018-09-04 | Stop reason: SDUPTHER

## 2018-07-20 NOTE — TELEPHONE ENCOUNTER
SHE ALSO NEEDS ALBUTEROL INHALER CALLED INTO PHARMACY. SHE IS NOT SURE WHO ORIGINALLY PRESCRIBED THE INHALER BUT NEEDS REFILLS ON ALBUTEROL INHALER. SHE IS COMPLETELY OUT OF PROZAC MEDICATION AND INHALER. SHE NEEDS CALLED INTO PHARMACY TODAY.

## 2018-07-28 DIAGNOSIS — J30.2 CHRONIC SEASONAL ALLERGIC RHINITIS, UNSPECIFIED TRIGGER: Chronic | ICD-10-CM

## 2018-07-29 RX ORDER — MONTELUKAST SODIUM 10 MG/1
10 TABLET ORAL NIGHTLY
Qty: 30 TABLET | Refills: 5 | Status: SHIPPED | OUTPATIENT
Start: 2018-07-29

## 2018-09-04 DIAGNOSIS — F41.8 DEPRESSION WITH ANXIETY: Chronic | ICD-10-CM

## 2018-09-04 RX ORDER — FLUOXETINE HYDROCHLORIDE 20 MG/1
60 CAPSULE ORAL DAILY
Qty: 90 CAPSULE | Refills: 0 | Status: SHIPPED | OUTPATIENT
Start: 2018-09-04 | End: 2018-09-04 | Stop reason: SDUPTHER

## 2018-09-04 NOTE — TELEPHONE ENCOUNTER
PT WOULD LIKE HER FLUOXETINE MEDICATION SENT TO THE [facility] IN [facility].

## 2018-09-05 RX ORDER — FLUOXETINE HYDROCHLORIDE 20 MG/1
60 CAPSULE ORAL DAILY
Qty: 90 CAPSULE | Refills: 0 | Status: SHIPPED | OUTPATIENT
Start: 2018-09-05

## 2018-10-02 DIAGNOSIS — J30.2 CHRONIC SEASONAL ALLERGIC RHINITIS: ICD-10-CM

## 2018-10-03 RX ORDER — LEVOCETIRIZINE DIHYDROCHLORIDE 5 MG/1
TABLET, FILM COATED ORAL
Qty: 30 TABLET | Refills: 5 | OUTPATIENT
Start: 2018-10-03

## 2019-01-21 DIAGNOSIS — F41.8 DEPRESSION WITH ANXIETY: Chronic | ICD-10-CM

## 2019-01-21 DIAGNOSIS — J30.2 CHRONIC SEASONAL ALLERGIC RHINITIS: ICD-10-CM

## 2019-01-21 RX ORDER — BUSPIRONE HYDROCHLORIDE 10 MG/1
TABLET ORAL
Qty: 60 TABLET | Refills: 5 | OUTPATIENT
Start: 2019-01-21

## 2019-01-21 RX ORDER — MONTELUKAST SODIUM 10 MG/1
TABLET ORAL
Qty: 30 TABLET | Refills: 5 | OUTPATIENT
Start: 2019-01-21

## 2019-02-21 DIAGNOSIS — F41.8 DEPRESSION WITH ANXIETY: Chronic | ICD-10-CM

## 2019-02-21 DIAGNOSIS — J30.2 CHRONIC SEASONAL ALLERGIC RHINITIS: ICD-10-CM

## 2019-02-21 RX ORDER — FLUOXETINE HYDROCHLORIDE 60 MG/1
TABLET, FILM COATED ORAL; ORAL
Qty: 30 TABLET | Refills: 5 | OUTPATIENT
Start: 2019-02-21

## 2019-02-21 RX ORDER — MONTELUKAST SODIUM 10 MG/1
TABLET ORAL
Qty: 30 TABLET | Refills: 5 | OUTPATIENT
Start: 2019-02-21

## 2019-03-06 DIAGNOSIS — F41.8 DEPRESSION WITH ANXIETY: Chronic | ICD-10-CM

## 2019-03-06 RX ORDER — FLUOXETINE HYDROCHLORIDE 60 MG/1
TABLET, FILM COATED ORAL; ORAL
Qty: 30 TABLET | Refills: 5 | OUTPATIENT
Start: 2019-03-06

## 2019-05-22 RX ORDER — ALBUTEROL SULFATE 90 UG/1
AEROSOL, METERED RESPIRATORY (INHALATION)
Qty: 1 INHALER | Refills: 3 | Status: SHIPPED | OUTPATIENT
Start: 2019-05-22 | End: 2020-01-25

## 2019-11-01 RX ORDER — ALBUTEROL SULFATE 90 UG/1
AEROSOL, METERED RESPIRATORY (INHALATION)
Refills: 3 | OUTPATIENT
Start: 2019-11-01

## 2020-01-25 RX ORDER — ALBUTEROL SULFATE 90 UG/1
AEROSOL, METERED RESPIRATORY (INHALATION)
Qty: 9 G | Refills: 0 | Status: SHIPPED | OUTPATIENT
Start: 2020-01-25

## 2020-10-14 ENCOUNTER — HOSPITAL ENCOUNTER (OUTPATIENT)
Dept: OTHER | Facility: HOSPITAL | Age: 35
Discharge: HOME | End: 2020-10-14
Attending: NURSE PRACTITIONER

## 2020-10-16 LAB
CONV MUMPS ANTIBODY IGG: 14.2 AU/ML
HBV SURFACE AB SER QL: REACTIVE
MEV IGG SER IA-ACNC: 42.4 AU/ML
RUBV IGG SER-ACNC: 414.9 [IU]/ML
VZV IGG SER IA-ACNC: 2853 INDEX

## 2020-11-02 ENCOUNTER — HOSPITAL ENCOUNTER (OUTPATIENT)
Dept: OTHER | Facility: HOSPITAL | Age: 35
Discharge: HOME | End: 2020-11-02
Attending: NURSE PRACTITIONER

## 2020-11-04 LAB — SARS-COV-2 RNA SPEC QL NAA+PROBE: NOT DETECTED
